# Patient Record
Sex: MALE | Race: WHITE | NOT HISPANIC OR LATINO | Employment: FULL TIME | URBAN - METROPOLITAN AREA
[De-identification: names, ages, dates, MRNs, and addresses within clinical notes are randomized per-mention and may not be internally consistent; named-entity substitution may affect disease eponyms.]

---

## 2023-03-28 ENCOUNTER — TELEPHONE (OUTPATIENT)
Dept: GASTROENTEROLOGY | Facility: CLINIC | Age: 43
End: 2023-03-28

## 2023-03-28 NOTE — TELEPHONE ENCOUNTER
Caller: Gibson Valverde    Relationship to patient: Self    Best call back number: [phone]    Patient is needing: PT CALLING TO CHECK TO SEE IF OA PAPERWORK WAS RECVD BY OFFICE. PT STATED HE MAILED BACK ABOUT TWO WEEKS AGO. CALL BACK ANYTIME. OK TO LVM.

## 2023-03-29 ENCOUNTER — PRE-PROCEDURE SCREENING (OUTPATIENT)
Dept: GASTROENTEROLOGY | Facility: CLINIC | Age: 43
End: 2023-03-29
Payer: COMMERCIAL

## 2023-03-29 NOTE — TELEPHONE ENCOUNTER
Colonoscopy screening--No personal history of polyps--Family history of polyps --No family history of  colon cancer--No blood thinners--Medications:        None            QUESTIONNAIRE SCREENING  SCAN IN  MEDIA & HAS BEEN SENT TO DOCTOR FOR REVIEW

## 2023-03-31 DIAGNOSIS — Z12.11 ENCOUNTER FOR SCREENING FOR COLORECTAL CANCER IN HIGH RISK PATIENT: Primary | ICD-10-CM

## 2023-03-31 DIAGNOSIS — Z83.71 FAMILY HISTORY OF POLYPS IN THE COLON: ICD-10-CM

## 2023-03-31 DIAGNOSIS — Z91.89 ENCOUNTER FOR SCREENING FOR COLORECTAL CANCER IN HIGH RISK PATIENT: Primary | ICD-10-CM

## 2023-03-31 DIAGNOSIS — Z12.12 ENCOUNTER FOR SCREENING FOR COLORECTAL CANCER IN HIGH RISK PATIENT: Primary | ICD-10-CM

## 2023-04-06 ENCOUNTER — TELEPHONE (OUTPATIENT)
Dept: GASTROENTEROLOGY | Facility: CLINIC | Age: 43
End: 2023-04-06
Payer: COMMERCIAL

## 2023-04-06 ENCOUNTER — TELEPHONE (OUTPATIENT)
Dept: GASTROENTEROLOGY | Facility: CLINIC | Age: 43
End: 2023-04-06

## 2023-04-06 NOTE — TELEPHONE ENCOUNTER
Hub staff attempted to follow warm transfer process and was unsuccessful     Caller: Gibson Valverde    Relationship to patient: Self    Best call back number: [phone]    Patient is needing: PT RETURNED CALL TO SCHEDULE PROCEDURE.    PLEASE CALL TO SCHEDULE.

## 2023-04-06 NOTE — TELEPHONE ENCOUNTER
Hub staff attempted to follow warm transfer process and was unsuccessful     Caller: Gibson Valverde    Relationship to patient: Self    Best call back number: [phone]    Patient is needing: A CALLBACK WANTING TO SCHEDULE A COLONOSCOPY. PT HAS MENTIONED HE HAS BEEN TRYING A WHOLE MONTH TRYING TO GET THIS SCHEDULED

## 2023-04-12 ENCOUNTER — TELEPHONE (OUTPATIENT)
Dept: GASTROENTEROLOGY | Facility: CLINIC | Age: 43
End: 2023-04-12

## 2023-04-12 ENCOUNTER — TELEPHONE (OUTPATIENT)
Dept: GASTROENTEROLOGY | Facility: CLINIC | Age: 43
End: 2023-04-12
Payer: COMMERCIAL

## 2023-04-12 NOTE — TELEPHONE ENCOUNTER
Caller: Gibson Valverde    Relationship to patient: Self    Best call back number: [phone]  CAN CALL ANY TIME.     Patient is needing: PATIENT NEEDING TO SCHEDULE COLONOSCOPY WITH DR. ROLLINS.

## 2023-04-13 ENCOUNTER — TELEPHONE (OUTPATIENT)
Dept: GASTROENTEROLOGY | Facility: CLINIC | Age: 43
End: 2023-04-13
Payer: COMMERCIAL

## 2023-04-13 PROBLEM — Z12.12 ENCOUNTER FOR SCREENING FOR COLORECTAL CANCER IN HIGH RISK PATIENT: Status: ACTIVE | Noted: 2023-04-13

## 2023-04-13 PROBLEM — Z83.719 FAMILY HISTORY OF POLYPS IN THE COLON: Status: ACTIVE | Noted: 2023-04-13

## 2023-04-13 PROBLEM — Z83.71 FAMILY HISTORY OF POLYPS IN THE COLON: Status: ACTIVE | Noted: 2023-04-13

## 2023-04-13 PROBLEM — Z12.11 ENCOUNTER FOR SCREENING FOR COLORECTAL CANCER IN HIGH RISK PATIENT: Status: ACTIVE | Noted: 2023-04-13

## 2023-04-13 PROBLEM — Z91.89 ENCOUNTER FOR SCREENING FOR COLORECTAL CANCER IN HIGH RISK PATIENT: Status: ACTIVE | Noted: 2023-04-13

## 2023-04-13 NOTE — TELEPHONE ENCOUNTER
SHABNAM patient via telephone for. Scheduled COLONOSCOPY 7/13/2023 with arrival time of 10:30AM. Prep paperwork mailed to verified address on file. Patient advised arrival time may change based on [facility] guidelines. SW LARISSSA MIRALAX

## 2023-05-25 ENCOUNTER — OFFICE VISIT (OUTPATIENT)
Dept: GASTROENTEROLOGY | Facility: CLINIC | Age: 43
End: 2023-05-25
Payer: COMMERCIAL

## 2023-05-25 VITALS
BODY MASS INDEX: 23.14 KG/M2 | HEART RATE: 71 BPM | TEMPERATURE: 96.4 F | HEIGHT: 76 IN | DIASTOLIC BLOOD PRESSURE: 69 MMHG | SYSTOLIC BLOOD PRESSURE: 106 MMHG | WEIGHT: 190 LBS

## 2023-05-25 DIAGNOSIS — Z83.71 FAMILY HISTORY OF POLYPS IN THE COLON: ICD-10-CM

## 2023-05-25 DIAGNOSIS — K64.9 HEMORRHOIDS, UNSPECIFIED HEMORRHOID TYPE: Primary | ICD-10-CM

## 2023-05-25 PROCEDURE — 99202 OFFICE O/P NEW SF 15 MIN: CPT | Performed by: INTERNAL MEDICINE

## 2023-05-25 NOTE — PROGRESS NOTES
Chief Complaint   Patient presents with   • Hemorrhoids        Gibson Valverde is a  43 y.o. male here for an initial visit for hemorrhoids, family history, screening for colorectal cancer    HPI this 43-year-old white male patient of Dr. Casey Coelho presents with a family history of polyps involving his son and hemorrhoidal issues intermittently.  He is scheduled for screening evaluation of his colon in July of this year.  Hemorrhoids seen to occur intermittently, specifically he will have some discomfort and bleeding from the rectum.  His bowel pattern is usually 2-3 stools per day.  His father has history of diverticular disease.  We discussed local therapy options regarding treatment of hemorrhoids and I will give him a more in-depth evaluation at the time of colonoscopy.    History reviewed. No pertinent past medical history.    No current outpatient medications on file.     No current facility-administered medications for this visit.       PRN Meds:.    No Known Allergies    Social History     Socioeconomic History   • Marital status:    Tobacco Use   • Smoking status: Former     Packs/day: 1.00     Years: 11.00     Pack years: 11.00     Types: Cigarettes     Start date: 1995     Quit date: 2006     Years since quittin.0   • Tobacco comments:     Dates are approximate   Substance and Sexual Activity   • Alcohol use: Yes     Alcohol/week: 2.0 standard drinks     Types: 1 Cans of beer, 1 Shots of liquor per week   • Drug use: Never   • Sexual activity: Yes     Partners: Female     Birth control/protection: Vasectomy, Hysterectomy       Family History   Problem Relation Age of Onset   • Inflammatory bowel disease Father    • Ulcerative colitis Father    • Diverticulitis Father    • Colon polyps Son        Review of Systems   Constitutional: Negative for activity change, appetite change, fatigue and unexpected weight change.   HENT: Negative for congestion, facial swelling, sore throat, trouble  swallowing and voice change.    Eyes: Negative for photophobia and visual disturbance.   Respiratory: Negative for cough and choking.    Cardiovascular: Negative for chest pain.   Gastrointestinal: Positive for anal bleeding and rectal pain. Negative for abdominal distention, abdominal pain, blood in stool, constipation, diarrhea, nausea and vomiting.   Endocrine: Negative for polyphagia.   Musculoskeletal: Negative for arthralgias, gait problem and joint swelling.   Skin: Negative for color change, pallor and rash.   Allergic/Immunologic: Negative for food allergies.   Neurological: Negative for speech difficulty and headaches.   Hematological: Does not bruise/bleed easily.   Psychiatric/Behavioral: Negative for agitation, confusion and sleep disturbance.       Vitals:    05/25/23 1426   BP: 106/69   Pulse: 71   Temp: 96.4 °F (35.8 °C)       Physical Exam  Constitutional:       Appearance: He is well-developed.   HENT:      Head: Normocephalic.   Eyes:      Conjunctiva/sclera: Conjunctivae normal.   Cardiovascular:      Rate and Rhythm: Normal rate and regular rhythm.   Pulmonary:      Breath sounds: Normal breath sounds.   Abdominal:      General: Bowel sounds are normal.      Palpations: Abdomen is soft.   Musculoskeletal:         General: Normal range of motion.      Cervical back: Normal range of motion.   Skin:     General: Skin is warm and dry.   Neurological:      Mental Status: He is alert and oriented to person, place, and time.   Psychiatric:         Behavior: Behavior normal.         ASSESSMENT   #1 hemorrhoids  #2 family history  #3 screening for colorectal cancer      PLAN  Proceed with colonoscopy as scheduled  Further recommendations pending that examination      ICD-10-CM ICD-9-CM   1. Hemorrhoids, unspecified hemorrhoid type  K64.9 455.6   2. Family history of polyps in the colon  Z83.71 V18.51

## 2024-03-28 ENCOUNTER — OFFICE VISIT (OUTPATIENT)
Dept: GASTROENTEROLOGY | Facility: CLINIC | Age: 44
End: 2024-03-28
Payer: COMMERCIAL

## 2024-03-28 VITALS
HEIGHT: 76 IN | HEART RATE: 82 BPM | BODY MASS INDEX: 22.53 KG/M2 | DIASTOLIC BLOOD PRESSURE: 72 MMHG | SYSTOLIC BLOOD PRESSURE: 107 MMHG | TEMPERATURE: 97.1 F | WEIGHT: 185 LBS | OXYGEN SATURATION: 97 %

## 2024-03-28 DIAGNOSIS — K64.9 HEMORRHOIDS, UNSPECIFIED HEMORRHOID TYPE: ICD-10-CM

## 2024-03-28 DIAGNOSIS — K62.5 RECTAL BLEEDING: Primary | ICD-10-CM

## 2024-03-28 DIAGNOSIS — D12.6 ADENOMATOUS POLYP OF COLON, UNSPECIFIED PART OF COLON: ICD-10-CM

## 2024-03-28 DIAGNOSIS — Z83.719 FAMILY HISTORY OF POLYPS IN THE COLON: ICD-10-CM

## 2024-03-28 PROCEDURE — 99213 OFFICE O/P EST LOW 20 MIN: CPT | Performed by: INTERNAL MEDICINE

## 2024-03-28 RX ORDER — SILDENAFIL 100 MG/1
50-100 TABLET, FILM COATED ORAL
COMMUNITY
Start: 2024-02-05 | End: 2025-02-04

## 2024-03-28 NOTE — PROGRESS NOTES
Chief Complaint   Patient presents with    Rectal Bleeding    Gas    Bloated        Gibson Valverde is a  44 y.o. male here for a follow up visit for rectal bleeding, personal history of polyps, hemorrhoids    HPI this 44-year-old white male patient of Dr. Casey Coelho presents in follow-up since last seen when undergoing colonoscopy in 2023.  That study revealed internal hemorrhoids and an adenomatous polyp.  He would be due for follow-up examination in 3 to 5 years given his strong family history as well as personal history.  He presents now with persistent rectal bleeding attributed to hemorrhoids.  He has not treated these but we talked about options including fiber supplement, stool softeners, and hydrocortisone based medication.  He would prefer the medication and we will try this for the short-term in the form of a hydrocortisone suppository to be taken at bedtime.  He will call with a progress report in 1 month.    Past Medical History:   Diagnosis Date    Colon polyp 2023    3 removed during colonoscopy       Current Outpatient Medications   Medication Sig Dispense Refill    sildenafil (VIAGRA) 100 MG tablet Take 0.5-1 tablets by mouth.       No current facility-administered medications for this visit.       PRN Meds:.    No Known Allergies    Social History     Socioeconomic History    Marital status:    Tobacco Use    Smoking status: Former     Current packs/day: 0.00     Average packs/day: 1 pack/day for 11.0 years (11.0 ttl pk-yrs)     Types: Cigarettes     Start date: 1995     Quit date: 2006     Years since quittin.9    Tobacco comments:     Dates are approximate   Vaping Use    Vaping status: Never Used   Substance and Sexual Activity    Alcohol use: Yes     Alcohol/week: 2.0 standard drinks of alcohol     Types: 1 Cans of beer, 1 Shots of liquor per week     Comment: weekends/socialy    Drug use: Never    Sexual activity: Yes     Partners: Female     Birth control/protection:  Vasectomy, Hysterectomy       Family History   Problem Relation Age of Onset    Inflammatory bowel disease Father     Ulcerative colitis Father     Diverticulitis Father     Colon polyps Son     Malig Hyperthermia Neg Hx        Review of Systems   Constitutional:  Negative for activity change, appetite change, fatigue and unexpected weight change.   HENT:  Negative for congestion, facial swelling, sore throat, trouble swallowing and voice change.    Eyes:  Negative for photophobia and visual disturbance.   Respiratory:  Negative for cough and choking.    Cardiovascular:  Negative for chest pain.   Gastrointestinal:  Positive for anal bleeding. Negative for abdominal distention, abdominal pain, blood in stool, constipation, diarrhea, nausea, rectal pain and vomiting.   Endocrine: Negative for polyphagia.   Musculoskeletal:  Negative for arthralgias, gait problem and joint swelling.   Skin:  Negative for color change, pallor and rash.   Allergic/Immunologic: Negative for food allergies.   Neurological:  Negative for speech difficulty and headaches.   Hematological:  Does not bruise/bleed easily.   Psychiatric/Behavioral:  Negative for agitation, confusion and sleep disturbance.        Vitals:    03/28/24 1511   BP: 107/72   Pulse: 82   Temp: 97.1 °F (36.2 °C)   SpO2: 97%       Physical Exam  Constitutional:       Appearance: He is well-developed.   HENT:      Head: Normocephalic.   Eyes:      Conjunctiva/sclera: Conjunctivae normal.   Cardiovascular:      Rate and Rhythm: Normal rate and regular rhythm.   Pulmonary:      Breath sounds: Normal breath sounds.   Abdominal:      General: Bowel sounds are normal.      Palpations: Abdomen is soft.   Musculoskeletal:         General: Normal range of motion.      Cervical back: Normal range of motion.   Skin:     General: Skin is warm and dry.   Neurological:      Mental Status: He is alert and oriented to person, place, and time.   Psychiatric:         Behavior: Behavior  normal.         ASSESSMENT   #1 rectal bleeding: Consistent with hemorrhoidal issues given previous colonoscopy was in July 2023  #2 history of adenomatous polyps  #3 family history of polyps  #4 internal hemorrhoids      PLAN  Hydrocortisone suppository nightly  Patient to call with a progress report in 1 month        ICD-10-CM ICD-9-CM   1. Rectal bleeding  K62.5 569.3   2. Adenomatous polyp of colon, unspecified part of colon  D12.6 211.3   3. Family history of polyps in the colon  Z83.719 V18.51   4. Hemorrhoids, unspecified hemorrhoid type  K64.9 455.6

## 2024-03-29 ENCOUNTER — TELEPHONE (OUTPATIENT)
Dept: GASTROENTEROLOGY | Facility: CLINIC | Age: 44
End: 2024-03-29
Payer: COMMERCIAL

## 2024-03-29 RX ORDER — HYDROCORTISONE ACETATE 25 MG/1
25 SUPPOSITORY RECTAL
Qty: 10 SUPPOSITORY | Refills: 2 | Status: SHIPPED | OUTPATIENT
Start: 2024-03-29

## 2024-03-29 NOTE — TELEPHONE ENCOUNTER
----- Message from Augustus VAUGHAN MD sent at 3/28/2024  4:06 PM EDT -----  Please call Rx for hydrocortisone suppository 25 mg at bedtime #10 with 2 refills

## 2025-08-04 ENCOUNTER — TELEPHONE (OUTPATIENT)
Dept: GASTROENTEROLOGY | Facility: CLINIC | Age: 45
End: 2025-08-04
Payer: COMMERCIAL

## 2025-08-04 ENCOUNTER — OFFICE VISIT (OUTPATIENT)
Dept: GASTROENTEROLOGY | Facility: CLINIC | Age: 45
End: 2025-08-04
Payer: COMMERCIAL

## 2025-08-04 VITALS
BODY MASS INDEX: 23.22 KG/M2 | DIASTOLIC BLOOD PRESSURE: 66 MMHG | SYSTOLIC BLOOD PRESSURE: 101 MMHG | HEIGHT: 76 IN | WEIGHT: 190.7 LBS | TEMPERATURE: 97.2 F | HEART RATE: 68 BPM

## 2025-08-04 DIAGNOSIS — Z86.0101 PERSONAL HISTORY OF ADENOMATOUS AND SERRATED COLON POLYPS: ICD-10-CM

## 2025-08-04 DIAGNOSIS — K62.5 RECTAL BLEEDING: ICD-10-CM

## 2025-08-04 DIAGNOSIS — Z83.719 FAMILY HISTORY OF POLYPS IN THE COLON: ICD-10-CM

## 2025-08-04 DIAGNOSIS — R14.0 ABDOMINAL BLOATING: Primary | ICD-10-CM

## 2025-08-04 PROCEDURE — 99214 OFFICE O/P EST MOD 30 MIN: CPT | Performed by: PHYSICIAN ASSISTANT

## 2025-08-06 LAB
ENDOMYSIUM IGA SER QL: NEGATIVE
GLIADIN PEPTIDE IGA SER-ACNC: 3 UNITS (ref 0–19)
GLIADIN PEPTIDE IGG SER-ACNC: 2 UNITS (ref 0–19)
IGA SERPL-MCNC: 178 MG/DL (ref 90–386)
TTG IGA SER-ACNC: <2 U/ML (ref 0–3)
TTG IGG SER-ACNC: 5 U/ML (ref 0–5)